# Patient Record
Sex: FEMALE | Race: WHITE
[De-identification: names, ages, dates, MRNs, and addresses within clinical notes are randomized per-mention and may not be internally consistent; named-entity substitution may affect disease eponyms.]

---

## 2017-06-08 ENCOUNTER — RX ONLY (OUTPATIENT)
Age: 67
Setting detail: RX ONLY
End: 2017-06-08

## 2017-06-08 RX ORDER — BETAMETHASONE DIPROPIONATE 0.5 MG/G
CREAM TOPICAL
Qty: 1 | Refills: 2 | Status: ERX | COMMUNITY
Start: 2017-06-08

## 2017-10-12 ENCOUNTER — APPOINTMENT (RX ONLY)
Dept: URBAN - METROPOLITAN AREA CLINIC 116 | Facility: CLINIC | Age: 67
Setting detail: DERMATOLOGY
End: 2017-10-12

## 2017-10-12 DIAGNOSIS — L20.89 OTHER ATOPIC DERMATITIS: ICD-10-CM

## 2017-10-12 DIAGNOSIS — L29.89 OTHER PRURITUS: ICD-10-CM

## 2017-10-12 DIAGNOSIS — L81.4 OTHER MELANIN HYPERPIGMENTATION: ICD-10-CM

## 2017-10-12 PROBLEM — L29.8 OTHER PRURITUS: Status: ACTIVE | Noted: 2017-10-12

## 2017-10-12 PROCEDURE — ? COUNSELING

## 2017-10-12 PROCEDURE — 96372 THER/PROPH/DIAG INJ SC/IM: CPT

## 2017-10-12 PROCEDURE — ? TREATMENT REGIMEN

## 2017-10-12 PROCEDURE — ? PRESCRIPTION

## 2017-10-12 PROCEDURE — 99214 OFFICE O/P EST MOD 30 MIN: CPT | Mod: 25

## 2017-10-12 PROCEDURE — ? INTRAMUSCULAR KENALOG

## 2017-10-12 RX ORDER — TRIAMCINOLONE ACETONIDE 1 MG/G
CREAM TOPICAL
Qty: 1 | Refills: 3 | Status: ERX | COMMUNITY
Start: 2017-10-12

## 2017-10-12 RX ADMIN — TRIAMCINOLONE ACETONIDE 1: 1 CREAM TOPICAL at 00:00

## 2017-10-12 ASSESSMENT — LOCATION ZONE DERM
LOCATION ZONE: LEG
LOCATION ZONE: TRUNK

## 2017-10-12 ASSESSMENT — LOCATION DETAILED DESCRIPTION DERM
LOCATION DETAILED: PERIUMBILICAL SKIN
LOCATION DETAILED: RIGHT INFERIOR UPPER BACK
LOCATION DETAILED: RIGHT BUTTOCK
LOCATION DETAILED: RIGHT KNEE

## 2017-10-12 ASSESSMENT — LOCATION SIMPLE DESCRIPTION DERM
LOCATION SIMPLE: ABDOMEN
LOCATION SIMPLE: RIGHT KNEE
LOCATION SIMPLE: RIGHT UPPER BACK
LOCATION SIMPLE: RIGHT BUTTOCK

## 2017-10-12 NOTE — PROCEDURE: INTRAMUSCULAR KENALOG
Kenalog Preparation: kenalog
Detail Level: None
Lot # (Optional): [id number]
Consent: The risks of atrophy were reviewed with the patient. Risks, benefits, side effects discussed including purpura, increased blood pressure, increased blood sugar, glaucoma, adrenal gland insufficiency, thinning of bones.
Concentration (Mg/Ml): 40.0
Administered By (Optional): Manny AMOR
Total Volume (Ccs): 1.5
Expiration Date (Optional): 10/2018

## 2018-09-25 ENCOUNTER — APPOINTMENT (RX ONLY)
Dept: URBAN - METROPOLITAN AREA CLINIC 116 | Facility: CLINIC | Age: 68
Setting detail: DERMATOLOGY
End: 2018-09-25

## 2018-09-25 DIAGNOSIS — L20.89 OTHER ATOPIC DERMATITIS: ICD-10-CM

## 2018-09-25 DIAGNOSIS — L29.89 OTHER PRURITUS: ICD-10-CM

## 2018-09-25 PROBLEM — L29.8 OTHER PRURITUS: Status: ACTIVE | Noted: 2018-09-25

## 2018-09-25 PROCEDURE — ? INTRAMUSCULAR KENALOG

## 2018-09-25 PROCEDURE — 96372 THER/PROPH/DIAG INJ SC/IM: CPT

## 2018-09-25 PROCEDURE — 99214 OFFICE O/P EST MOD 30 MIN: CPT | Mod: 25

## 2018-09-25 PROCEDURE — ? TREATMENT REGIMEN

## 2018-09-25 PROCEDURE — ? COUNSELING

## 2018-09-25 ASSESSMENT — LOCATION ZONE DERM
LOCATION ZONE: TRUNK
LOCATION ZONE: LEG

## 2018-09-25 ASSESSMENT — LOCATION DETAILED DESCRIPTION DERM
LOCATION DETAILED: RIGHT INFERIOR UPPER BACK
LOCATION DETAILED: PERIUMBILICAL SKIN
LOCATION DETAILED: RIGHT KNEE
LOCATION DETAILED: LEFT BUTTOCK

## 2018-09-25 ASSESSMENT — LOCATION SIMPLE DESCRIPTION DERM
LOCATION SIMPLE: LEFT BUTTOCK
LOCATION SIMPLE: RIGHT UPPER BACK
LOCATION SIMPLE: ABDOMEN
LOCATION SIMPLE: RIGHT KNEE

## 2018-09-25 NOTE — PROCEDURE: TREATMENT REGIMEN
Detail Level: Zone
Otc Regimen: Free and clear products
Continue Regimen: Cuba Chin
Continue Regimen: Triamcinolone cream as needed

## 2018-09-25 NOTE — PROCEDURE: INTRAMUSCULAR KENALOG
Ndc# (Optional): 9233666491
Concentration (Mg/Ml): 40.0
Consent: The risks of atrophy were reviewed with the patient. Risks, benefits, side effects discussed including purpura, increased blood pressure, increased blood sugar, glaucoma, adrenal gland insufficiency, thinning of bones.
Administered By (Optional): Yamila Fuentes
Detail Level: None
Concentration (Mg/Ml) Of Additional Medication: 2.5
Total Volume (Ccs): 1.5
Lot # (Optional): [id number]
Kenalog Preparation: kenalog
Expiration Date (Optional): 2/20
Add Option For Additional Mediation: No

## 2019-06-07 ENCOUNTER — APPOINTMENT (RX ONLY)
Dept: URBAN - METROPOLITAN AREA CLINIC 128 | Facility: CLINIC | Age: 69
Setting detail: DERMATOLOGY
End: 2019-06-07

## 2019-06-07 DIAGNOSIS — L29.89 OTHER PRURITUS: ICD-10-CM

## 2019-06-07 DIAGNOSIS — L82.0 INFLAMED SEBORRHEIC KERATOSIS: ICD-10-CM

## 2019-06-07 DIAGNOSIS — L28.0 LICHEN SIMPLEX CHRONICUS: ICD-10-CM

## 2019-06-07 DIAGNOSIS — L20.89 OTHER ATOPIC DERMATITIS: ICD-10-CM

## 2019-06-07 PROBLEM — L29.8 OTHER PRURITUS: Status: ACTIVE | Noted: 2019-06-07

## 2019-06-07 PROCEDURE — ? PRESCRIPTION

## 2019-06-07 PROCEDURE — ? COUNSELING

## 2019-06-07 PROCEDURE — 11900 INJECT SKIN LESIONS </W 7: CPT

## 2019-06-07 PROCEDURE — ? TREATMENT REGIMEN

## 2019-06-07 PROCEDURE — ? INTRALESIONAL KENALOG

## 2019-06-07 PROCEDURE — 99214 OFFICE O/P EST MOD 30 MIN: CPT | Mod: 25

## 2019-06-07 PROCEDURE — ? OTHER

## 2019-06-07 RX ORDER — CLOBETASOL PROPIONATE 0.5 MG/G
OINTMENT TOPICAL
Qty: 1 | Refills: 2 | Status: ERX | COMMUNITY
Start: 2019-06-07

## 2019-06-07 RX ADMIN — CLOBETASOL PROPIONATE 1: 0.5 OINTMENT TOPICAL at 00:00

## 2019-06-07 ASSESSMENT — LOCATION DETAILED DESCRIPTION DERM
LOCATION DETAILED: RIGHT INFERIOR UPPER BACK
LOCATION DETAILED: LEFT ANTERIOR DISTAL UPPER ARM
LOCATION DETAILED: RIGHT KNEE
LOCATION DETAILED: RIGHT MEDIAL KNEE
LOCATION DETAILED: PERIUMBILICAL SKIN

## 2019-06-07 ASSESSMENT — LOCATION ZONE DERM
LOCATION ZONE: LEG
LOCATION ZONE: TRUNK
LOCATION ZONE: ARM

## 2019-06-07 ASSESSMENT — LOCATION SIMPLE DESCRIPTION DERM
LOCATION SIMPLE: RIGHT KNEE
LOCATION SIMPLE: LEFT UPPER ARM
LOCATION SIMPLE: RIGHT UPPER BACK
LOCATION SIMPLE: ABDOMEN

## 2019-06-07 NOTE — PROCEDURE: TREATMENT REGIMEN
Detail Level: Zone
Continue Regimen: Triamcinolone cream as needed
Continue Regimen: Luise Cowden
Otc Regimen: Free and clear products

## 2019-06-07 NOTE — PROCEDURE: MIPS QUALITY
Detail Level: Detailed
Quality 131: Pain Assessment And Follow-Up: Pain assessment using a standardized tool is documented as negative, no follow-up plan required
Quality 130: Documentation Of Current Medications In The Medical Record: Current Medications Documented
Additional Notes: Pain 0/10\\nPatient does not think she had it
Quality 474: Zoster Vaccination Status: Shingrix vaccine was not administered for reasons documented by clinician (e.g. patient administered vaccine other than Shingrix, patient allergy or other medical reasons, patient declined or other patient reasons, vaccine not available or other system reasons)

## 2019-06-07 NOTE — PROCEDURE: INTRALESIONAL KENALOG
Lot # (Optional): [id number]
X Size Of Lesion In Cm (Optional): 0
Administered By (Optional): Leah Cantrell
Consent: The risks of atrophy were reviewed with the patient.
Total Volume Injected (Ccs- Only Use Numbers And Decimals): 1
Include Z78.9 (Other Specified Conditions Influencing Health Status) As An Associated Diagnosis?: No
Ndc# For Kenalog Only: 6644-6803-94
Detail Level: Detailed
Medical Necessity Clause: This procedure was medically necessary because the lesions that were treated were:
Expiration Date (Optional): 09/2020
Kenalog Preparation: Kenalog
Concentration Of Solution Injected (Mg/Ml): 10.0

## 2020-02-19 ENCOUNTER — APPOINTMENT (RX ONLY)
Dept: URBAN - METROPOLITAN AREA CLINIC 128 | Facility: CLINIC | Age: 70
Setting detail: DERMATOLOGY
End: 2020-02-19

## 2020-02-19 DIAGNOSIS — L29.89 OTHER PRURITUS: ICD-10-CM

## 2020-02-19 DIAGNOSIS — L28.0 LICHEN SIMPLEX CHRONICUS: ICD-10-CM

## 2020-02-19 DIAGNOSIS — L20.89 OTHER ATOPIC DERMATITIS: ICD-10-CM

## 2020-02-19 PROBLEM — L29.8 OTHER PRURITUS: Status: ACTIVE | Noted: 2020-02-19

## 2020-02-19 PROCEDURE — ? INTRAMUSCULAR KENALOG

## 2020-02-19 PROCEDURE — ? TREATMENT REGIMEN

## 2020-02-19 PROCEDURE — ? PRESCRIPTION

## 2020-02-19 PROCEDURE — 96372 THER/PROPH/DIAG INJ SC/IM: CPT

## 2020-02-19 PROCEDURE — ? COUNSELING

## 2020-02-19 PROCEDURE — 99214 OFFICE O/P EST MOD 30 MIN: CPT | Mod: 25

## 2020-02-19 PROCEDURE — ? INJECTION

## 2020-02-19 RX ORDER — CRISABOROLE 20 MG/G
1 OINTMENT TOPICAL BID
Qty: 1 | Refills: 3 | Status: ERX | COMMUNITY
Start: 2020-02-19

## 2020-02-19 RX ADMIN — CRISABOROLE 1: 20 OINTMENT TOPICAL at 00:00

## 2020-02-19 ASSESSMENT — LOCATION SIMPLE DESCRIPTION DERM
LOCATION SIMPLE: RIGHT BUTTOCK
LOCATION SIMPLE: ABDOMEN
LOCATION SIMPLE: RIGHT UPPER BACK
LOCATION SIMPLE: RIGHT KNEE

## 2020-02-19 ASSESSMENT — LOCATION ZONE DERM
LOCATION ZONE: TRUNK
LOCATION ZONE: LEG

## 2020-02-19 ASSESSMENT — LOCATION DETAILED DESCRIPTION DERM
LOCATION DETAILED: RIGHT KNEE
LOCATION DETAILED: PERIUMBILICAL SKIN
LOCATION DETAILED: RIGHT BUTTOCK
LOCATION DETAILED: RIGHT INFERIOR UPPER BACK

## 2020-02-19 NOTE — PROCEDURE: TREATMENT REGIMEN
Detail Level: Zone
Continue Regimen: Triamcinolone cream as needed\\n\\nClobetasol cream as needed
Otc Regimen: Free and clear products
Continue Regimen: Payal Quiles

## 2020-02-19 NOTE — PROCEDURE: INTRAMUSCULAR KENALOG
Add Option For Additional Mediation: No
Administered By (Optional): ZULEYMA Hernandez
Expiration Date (Optional): 03/2021
Concentration (Mg/Ml) Of Additional Medication: 2.5
Total Volume (Ccs): 1.5
Kenalog Preparation: kenalog
Concentration (Mg/Ml): 40.0
Ndc# Optional): 4842-9773-77
Detail Level: Simple
Consent: The risks of atrophy were reviewed with the patient. Risks, benefits, side effects discussed including purpura, increased blood pressure, increased blood sugar, glaucoma, adrenal gland insufficiency, thinning of bones.
Lot # (Optional): [id number]

## 2020-02-19 NOTE — HPI: RASH
What Type Of Note Output Would You Prefer (Optional)?: Standard Output
How Severe Is Your Rash?: moderate
Is This A New Presentation, Or A Follow-Up?: Rash
Additional History: Clobetasol cream and Triamcinolone cream have not helped

## 2020-02-19 NOTE — PROCEDURE: INJECTION
Lot # (Optional): [id number]
Expiration Date (Optional): 3/2021
Ndc # (Optional): 1824-6477-54
Total Volume Injected In Cc (Will Not Affected Billing): 1
Render J-Code Information In Note?: yes
Hide Second Medication?: No
Administered By (Optional): ZULEYMA Hernandez
Dose Administered (Numbers Only): 0
Units: mg
Consent: The risks of the medication was reviewed with the patient. Risks, benefits and side effects discussed including purpura, increased blood pressure, increased blood sugar, glaucoma, adrenal gland insufficiency, thinning of bones.
Post-Care Instructions: I reviewed with the patient in detail post-care instructions. Patient understands to keep the injection sites clean and call the clinic if there is any redness, swelling or pain.
Procedure Information: Please note that the numeric value listed in the Medication (1) and associated J-code units and Medication (2) and associated J-code units variables are j-code amounts and do not represent either the concentration or the total amount of the medications injected. I strongly recommend selecting no to the Render J-code information in note question. This will allow your note to be more clear. If you are billing j-codes with your injection codes you need to document the total amount of the medication injected. This amount should match the j-code units. For example, if you are injecting Triamcinolone 40mg as an intramuscular injection you would select 40 for the dose field and mg for the units. This would allow you to document  with 4 units (40mg = 10mg x 4). The total volume is not used to calculate j-codes only the amount of the medication administered.
Detail Level: Detailed
Medication (1) And Associated J-Code Units: Betamethasone Sodium Phosphate, 3mg
Lot # (Optional): [id number]
Expiration Date (Optional): 8/2020
Route: IM
Ndc # Optional): 7569-0724-16
Dose Administered (Numbers Only): 6

## 2020-04-01 ENCOUNTER — RX ONLY (OUTPATIENT)
Age: 70
Setting detail: RX ONLY
End: 2020-04-01

## 2020-04-01 RX ORDER — TRIAMCINOLONE ACETONIDE 1 MG/G
1 CREAM TOPICAL BID
Qty: 1 | Refills: 2 | Status: ERX

## 2020-04-16 ENCOUNTER — APPOINTMENT (RX ONLY)
Dept: URBAN - METROPOLITAN AREA CLINIC 128 | Facility: CLINIC | Age: 70
Setting detail: DERMATOLOGY
End: 2020-04-16

## 2020-04-16 DIAGNOSIS — L20.89 OTHER ATOPIC DERMATITIS: ICD-10-CM

## 2020-04-16 DIAGNOSIS — L29.89 OTHER PRURITUS: ICD-10-CM

## 2020-04-16 DIAGNOSIS — L28.0 LICHEN SIMPLEX CHRONICUS: ICD-10-CM

## 2020-04-16 PROBLEM — L29.8 OTHER PRURITUS: Status: ACTIVE | Noted: 2020-04-16

## 2020-04-16 PROCEDURE — 99214 OFFICE O/P EST MOD 30 MIN: CPT | Mod: 25

## 2020-04-16 PROCEDURE — ? TREATMENT REGIMEN

## 2020-04-16 PROCEDURE — ? COUNSELING

## 2020-04-16 PROCEDURE — ? INTRAMUSCULAR KENALOG

## 2020-04-16 PROCEDURE — ? INJECTION

## 2020-04-16 PROCEDURE — 96372 THER/PROPH/DIAG INJ SC/IM: CPT

## 2020-04-16 ASSESSMENT — LOCATION ZONE DERM
LOCATION ZONE: LEG
LOCATION ZONE: TRUNK

## 2020-04-16 ASSESSMENT — LOCATION DETAILED DESCRIPTION DERM
LOCATION DETAILED: LEFT BUTTOCK
LOCATION DETAILED: RIGHT BUTTOCK
LOCATION DETAILED: RIGHT INFERIOR UPPER BACK
LOCATION DETAILED: RIGHT KNEE
LOCATION DETAILED: PERIUMBILICAL SKIN

## 2020-04-16 ASSESSMENT — LOCATION SIMPLE DESCRIPTION DERM
LOCATION SIMPLE: RIGHT BUTTOCK
LOCATION SIMPLE: LEFT BUTTOCK
LOCATION SIMPLE: ABDOMEN
LOCATION SIMPLE: RIGHT KNEE
LOCATION SIMPLE: RIGHT UPPER BACK

## 2020-04-16 NOTE — PROCEDURE: INTRAMUSCULAR KENALOG
Ndc# (Optional): 8245-0346-24
Concentration (Mg/Ml): 40.0
Consent: The risks of atrophy were reviewed with the patient. Risks, benefits, side effects discussed including purpura, increased blood pressure, increased blood sugar, glaucoma, adrenal gland insufficiency, thinning of bones.
Add Option For Additional Mediation: No
Detail Level: Simple
Administered By (Optional): ZULEYMA Hernandez
Expiration Date (Optional): 04/2021
Lot # (Optional): [id number]
Kenalog Preparation: kenalog
Concentration (Mg/Ml) Of Additional Medication: 2.5
Total Volume (Ccs): 1.5

## 2020-04-16 NOTE — PROCEDURE: INJECTION
Units: mg
Medication (1) And Associated J-Code Units: Betamethasone Sodium Phosphate, 3mg
Treatment Number: 0
Ndc # Optional): 9552-7787-24
Post-Care Instructions: I reviewed with the patient in detail post-care instructions. Patient understands to keep the injection sites clean and call the clinic if there is any redness, swelling or pain.
Route: IM
Bill J-Code: no
Render J-Code Information In Note?: yes
Lot # (Optional): [id number]
Detail Level: Detailed
Administered By (Optional): ZULEYMA Hernandez
Expiration Date (Optional): 8/2020
Ndc # Optional): 2223-5177-88
Total Volume Injected In Cc (Will Not Affected Billing): 1
Procedure Information: Please note that the numeric value listed in the Medication (1) and associated J-code units and Medication (2) and associated J-code units variables are j-code amounts and do not represent either the concentration or the total amount of the medications injected. I strongly recommend selecting no to the Render J-code information in note question. This will allow your note to be more clear. If you are billing j-codes with your injection codes you need to document the total amount of the medication injected. This amount should match the j-code units. For example, if you are injecting Triamcinolone 40mg as an intramuscular injection you would select 40 for the dose field and mg for the units. This would allow you to document  with 4 units (40mg = 10mg x 4). The total volume is not used to calculate j-codes only the amount of the medication administered.
Consent: The risks of the medication was reviewed with the patient. Risks, benefits and side effects discussed including purpura, increased blood pressure, increased blood sugar, glaucoma, adrenal gland insufficiency, thinning of bones.
Dose Administered (Numbers Only): 6
Lot # (Optional): [id number]
Expiration Date (Optional): 3/2021

## 2020-04-16 NOTE — PROCEDURE: TREATMENT REGIMEN
Initiate Treatment: Allegra - take 2 tabs in the morning
Continue Regimen: Triamcinolone cream as needed\\n\\nClobetasol cream as needed\\n\\nCetaphil cream - apply throughout the day \\n\\nHydroxyzine- take at night
Detail Level: Zone
Otc Regimen: Free and clear products
Continue Regimen: Sobeida Puri

## 2020-06-03 ENCOUNTER — APPOINTMENT (RX ONLY)
Dept: URBAN - METROPOLITAN AREA CLINIC 128 | Facility: CLINIC | Age: 70
Setting detail: DERMATOLOGY
End: 2020-06-03

## 2020-06-03 DIAGNOSIS — D69.2 OTHER NONTHROMBOCYTOPENIC PURPURA: ICD-10-CM

## 2020-06-03 DIAGNOSIS — L28.0 LICHEN SIMPLEX CHRONICUS: ICD-10-CM

## 2020-06-03 DIAGNOSIS — L20.89 OTHER ATOPIC DERMATITIS: ICD-10-CM

## 2020-06-03 DIAGNOSIS — L29.89 OTHER PRURITUS: ICD-10-CM

## 2020-06-03 PROBLEM — L29.8 OTHER PRURITUS: Status: ACTIVE | Noted: 2020-06-03

## 2020-06-03 PROCEDURE — ? INTRAMUSCULAR KENALOG

## 2020-06-03 PROCEDURE — ? TREATMENT REGIMEN

## 2020-06-03 PROCEDURE — ? COUNSELING

## 2020-06-03 PROCEDURE — 96372 THER/PROPH/DIAG INJ SC/IM: CPT

## 2020-06-03 PROCEDURE — 99214 OFFICE O/P EST MOD 30 MIN: CPT | Mod: 25

## 2020-06-03 ASSESSMENT — LOCATION DETAILED DESCRIPTION DERM
LOCATION DETAILED: LEFT PROXIMAL PRETIBIAL REGION
LOCATION DETAILED: RIGHT PROXIMAL DORSAL FOREARM
LOCATION DETAILED: RIGHT KNEE
LOCATION DETAILED: PERIUMBILICAL SKIN
LOCATION DETAILED: LEFT DISTAL DORSAL FOREARM
LOCATION DETAILED: RIGHT INFERIOR UPPER BACK
LOCATION DETAILED: RIGHT PROXIMAL PRETIBIAL REGION
LOCATION DETAILED: RIGHT BUTTOCK

## 2020-06-03 ASSESSMENT — LOCATION ZONE DERM
LOCATION ZONE: LEG
LOCATION ZONE: TRUNK
LOCATION ZONE: ARM

## 2020-06-03 ASSESSMENT — LOCATION SIMPLE DESCRIPTION DERM
LOCATION SIMPLE: LEFT PRETIBIAL REGION
LOCATION SIMPLE: ABDOMEN
LOCATION SIMPLE: RIGHT FOREARM
LOCATION SIMPLE: RIGHT KNEE
LOCATION SIMPLE: LEFT FOREARM
LOCATION SIMPLE: RIGHT UPPER BACK
LOCATION SIMPLE: RIGHT BUTTOCK
LOCATION SIMPLE: RIGHT PRETIBIAL REGION

## 2020-06-03 NOTE — PROCEDURE: INTRAMUSCULAR KENALOG
Kenalog Preparation: kenalog
Consent: The risks of atrophy were reviewed with the patient. Risks, benefits, side effects discussed including purpura, increased blood pressure, increased blood sugar, glaucoma, adrenal gland insufficiency, thinning of bones.
Administered By (Optional): ZULEYMA Hernandez
Concentration (Mg/Ml): 40.0
Detail Level: Simple
Add Option For Additional Mediation: No
Expiration Date (Optional): 06/2021
Total Volume (Ccs): 1.5
Lot # (Optional): [id number]
Ndc# (Optional): 3515-5572-40
Concentration (Mg/Ml) Of Additional Medication: 2.5

## 2020-06-03 NOTE — PROCEDURE: TREATMENT REGIMEN
Continue Regimen: Triamcinolone cream as needed\\n\\nClobetasol cream as needed\\n\\nCetaphil cream - apply throughout the day \\n\\nHydroxyzine- take at night\\n\\nAllegra - take 2 tabs in the morning
Detail Level: Zone
Continue Regimen: Jn Snide
Otc Regimen: Free and clear products
Detail Level: Simple
Otc Regimen: Recommended Dermend lotion

## 2020-12-08 ENCOUNTER — APPOINTMENT (RX ONLY)
Dept: URBAN - METROPOLITAN AREA CLINIC 116 | Facility: CLINIC | Age: 70
Setting detail: DERMATOLOGY
End: 2020-12-08

## 2020-12-08 ENCOUNTER — RX ONLY (OUTPATIENT)
Age: 70
Setting detail: RX ONLY
End: 2020-12-08

## 2020-12-08 DIAGNOSIS — L29.89 OTHER PRURITUS: ICD-10-CM

## 2020-12-08 DIAGNOSIS — L20.89 OTHER ATOPIC DERMATITIS: ICD-10-CM

## 2020-12-08 PROBLEM — L29.8 OTHER PRURITUS: Status: ACTIVE | Noted: 2020-12-08

## 2020-12-08 PROCEDURE — ? TREATMENT REGIMEN

## 2020-12-08 PROCEDURE — 99214 OFFICE O/P EST MOD 30 MIN: CPT | Mod: 25

## 2020-12-08 PROCEDURE — ? COUNSELING

## 2020-12-08 PROCEDURE — ? INTRAMUSCULAR KENALOG

## 2020-12-08 PROCEDURE — 96372 THER/PROPH/DIAG INJ SC/IM: CPT

## 2020-12-08 RX ORDER — CLOBETASOL PROPIONATE 0.5 MG/G
1 OINTMENT TOPICAL BID
Qty: 1 | Refills: 2 | Status: ERX

## 2020-12-08 ASSESSMENT — LOCATION DETAILED DESCRIPTION DERM
LOCATION DETAILED: RIGHT BUTTOCK
LOCATION DETAILED: PERIUMBILICAL SKIN
LOCATION DETAILED: RIGHT KNEE
LOCATION DETAILED: RIGHT INFERIOR UPPER BACK

## 2020-12-08 ASSESSMENT — LOCATION SIMPLE DESCRIPTION DERM
LOCATION SIMPLE: ABDOMEN
LOCATION SIMPLE: RIGHT UPPER BACK
LOCATION SIMPLE: RIGHT BUTTOCK
LOCATION SIMPLE: RIGHT KNEE

## 2020-12-08 ASSESSMENT — LOCATION ZONE DERM
LOCATION ZONE: LEG
LOCATION ZONE: TRUNK

## 2020-12-08 NOTE — PROCEDURE: TREATMENT REGIMEN
Continue Regimen: Triamcinolone cream - apply twice daily for 2 weeks, 1 week off, then as needed. \\n\\nClobetasol ointment - apply twice daily for 2 weeks, 1 week off, then as needed. \\n\\nCetaphil cream - apply throughout the day \\n\\nHydroxyzine- take at night\\n\\nAllegra - take 1-2 tabs in the morning
Plan: Discussed phototherapy and Dupixent
Detail Level: Zone
Continue Regimen: Vic Juarez
Otc Regimen: Free and clear products

## 2020-12-08 NOTE — PROCEDURE: INTRAMUSCULAR KENALOG
Expiration Date (Optional): 06/2022
Administered By (Optional): ZULEYMA Hernandez
Ndc# (Optional): [address]-6
Consent: The risks of atrophy were reviewed with the patient. Risks, benefits, side effects discussed including purpura, increased blood pressure, increased blood sugar, glaucoma, adrenal gland insufficiency, thinning of bones.
Total Volume (Ccs): 1.5
Concentration (Mg/Ml): 40.0
Lot # (Optional): [id number]
Concentration (Mg/Ml) Of Additional Medication: 2.5
Add Option For Additional Mediation: No
Kenalog Preparation: kenalog
Detail Level: Simple

## 2021-05-11 ENCOUNTER — RX ONLY (OUTPATIENT)
Age: 71
Setting detail: RX ONLY
End: 2021-05-11

## 2021-05-11 ENCOUNTER — APPOINTMENT (RX ONLY)
Dept: URBAN - METROPOLITAN AREA CLINIC 116 | Facility: CLINIC | Age: 71
Setting detail: DERMATOLOGY
End: 2021-05-11

## 2021-05-11 DIAGNOSIS — L28.0 LICHEN SIMPLEX CHRONICUS: ICD-10-CM

## 2021-05-11 DIAGNOSIS — L29.89 OTHER PRURITUS: ICD-10-CM

## 2021-05-11 DIAGNOSIS — L20.89 OTHER ATOPIC DERMATITIS: ICD-10-CM

## 2021-05-11 PROBLEM — L29.8 OTHER PRURITUS: Status: ACTIVE | Noted: 2021-05-11

## 2021-05-11 PROCEDURE — ? COUNSELING

## 2021-05-11 PROCEDURE — 96372 THER/PROPH/DIAG INJ SC/IM: CPT

## 2021-05-11 PROCEDURE — ? TREATMENT REGIMEN

## 2021-05-11 PROCEDURE — ? PRESCRIPTION MEDICATION MANAGEMENT

## 2021-05-11 PROCEDURE — 99214 OFFICE O/P EST MOD 30 MIN: CPT | Mod: 25

## 2021-05-11 PROCEDURE — ? INTRAMUSCULAR KENALOG

## 2021-05-11 RX ORDER — CLOBETASOL PROPIONATE 0.5 MG/G
1 OINTMENT TOPICAL BID
Qty: 1 | Refills: 3 | Status: ERX

## 2021-05-11 ASSESSMENT — LOCATION DETAILED DESCRIPTION DERM
LOCATION DETAILED: LEFT ANTERIOR PROXIMAL THIGH
LOCATION DETAILED: LEFT BUTTOCK
LOCATION DETAILED: PERIUMBILICAL SKIN
LOCATION DETAILED: RIGHT INFERIOR UPPER BACK
LOCATION DETAILED: RIGHT MEDIAL KNEE
LOCATION DETAILED: RIGHT KNEE

## 2021-05-11 ASSESSMENT — LOCATION ZONE DERM
LOCATION ZONE: LEG
LOCATION ZONE: TRUNK

## 2021-05-11 ASSESSMENT — SEVERITY ASSESSMENT 2020: SEVERITY 2020: MODERATE

## 2021-05-11 ASSESSMENT — LOCATION SIMPLE DESCRIPTION DERM
LOCATION SIMPLE: ABDOMEN
LOCATION SIMPLE: LEFT THIGH
LOCATION SIMPLE: RIGHT UPPER BACK
LOCATION SIMPLE: LEFT BUTTOCK
LOCATION SIMPLE: RIGHT KNEE

## 2021-05-11 NOTE — PROCEDURE: INTRAMUSCULAR KENALOG
Concentration (Mg/Ml) Of Additional Medication: 6.0
Detail Level: Simple
Expiration Date (Optional): 5/2022
Ndc# (Optional): 7933-6392-01
Administered By (Optional): ZULEYMA Dhillon
Consent: The risks of atrophy were reviewed with the patient. Risks, benefits, side effects discussed including purpura, increased blood pressure, increased blood sugar, glaucoma, adrenal gland insufficiency, thinning of bones.
Total Volume (Ccs): 1.5
Lot # (Optional): [id number]
Add Option For Additional Mediation: Yes
Kenalog Preparation: kenalog
Concentration (Mg/Ml): 40.0

## 2021-05-11 NOTE — PROCEDURE: PRESCRIPTION MEDICATION MANAGEMENT
Render In Strict Bullet Format?: No
Detail Level: Simple
Continue Regimen: Triamcinolone cream - apply twice daily for 2 weeks, 1 week off, then as needed. \\n\\nClobetasol ointment - apply twice daily for 2 weeks, 1 week off, then as needed. \\n\\nCetaphil cream - apply throughout the day \\n\\nHydroxyzine- take at night\\n\\nAllegra - take 1-2 tabs in the morning. \\n\\nPlan: Discussed phototherapy and Dupixent.

## 2021-08-19 ENCOUNTER — APPOINTMENT (RX ONLY)
Dept: URBAN - METROPOLITAN AREA CLINIC 116 | Facility: CLINIC | Age: 71
Setting detail: DERMATOLOGY
End: 2021-08-19

## 2021-08-19 DIAGNOSIS — L28.0 LICHEN SIMPLEX CHRONICUS: ICD-10-CM

## 2021-08-19 DIAGNOSIS — L29.89 OTHER PRURITUS: ICD-10-CM

## 2021-08-19 DIAGNOSIS — L20.89 OTHER ATOPIC DERMATITIS: ICD-10-CM

## 2021-08-19 PROBLEM — L29.8 OTHER PRURITUS: Status: ACTIVE | Noted: 2021-08-19

## 2021-08-19 PROCEDURE — ? INTRAMUSCULAR KENALOG

## 2021-08-19 PROCEDURE — ? TREATMENT REGIMEN

## 2021-08-19 PROCEDURE — ? COUNSELING

## 2021-08-19 PROCEDURE — 96372 THER/PROPH/DIAG INJ SC/IM: CPT

## 2021-08-19 PROCEDURE — 99214 OFFICE O/P EST MOD 30 MIN: CPT | Mod: 25

## 2021-08-19 PROCEDURE — ? PRESCRIPTION MEDICATION MANAGEMENT

## 2021-08-19 ASSESSMENT — LOCATION ZONE DERM
LOCATION ZONE: TRUNK
LOCATION ZONE: LEG

## 2021-08-19 ASSESSMENT — LOCATION SIMPLE DESCRIPTION DERM
LOCATION SIMPLE: ABDOMEN
LOCATION SIMPLE: RIGHT BUTTOCK
LOCATION SIMPLE: RIGHT KNEE
LOCATION SIMPLE: LEFT THIGH
LOCATION SIMPLE: RIGHT UPPER BACK

## 2021-08-19 ASSESSMENT — SEVERITY ASSESSMENT 2020: SEVERITY 2020: MODERATE

## 2021-08-19 ASSESSMENT — LOCATION DETAILED DESCRIPTION DERM
LOCATION DETAILED: RIGHT INFERIOR UPPER BACK
LOCATION DETAILED: RIGHT KNEE
LOCATION DETAILED: RIGHT BUTTOCK
LOCATION DETAILED: PERIUMBILICAL SKIN
LOCATION DETAILED: LEFT ANTERIOR PROXIMAL THIGH
LOCATION DETAILED: RIGHT MEDIAL KNEE

## 2021-08-19 NOTE — PROCEDURE: PRESCRIPTION MEDICATION MANAGEMENT
Plan: Discussed phototherapy and Dupixent.
Render In Strict Bullet Format?: No
Detail Level: Simple
Continue Regimen: Triamcinolone cream - apply twice daily for 2 weeks, 1 week off, then as needed. \\n\\nClobetasol ointment - apply twice daily for 2 weeks, 1 week off, then as needed. \\n\\nCetaphil cream - apply throughout the day \\n\\nHydroxyzine- take at night\\n\\nAllegra - take 1-2 tabs in the morning.

## 2021-08-19 NOTE — PROCEDURE: INTRAMUSCULAR KENALOG
Concentration (Mg/Ml) Of Additional Medication: 6.0
Detail Level: Simple
Expiration Date (Optional): 5/2022
Ndc# (Optional): 9472-7996-25
Administered By (Optional): ZULEYMA Dhillon
Consent: The risks of atrophy were reviewed with the patient. Risks, benefits, side effects discussed including purpura, increased blood pressure, increased blood sugar, glaucoma, adrenal gland insufficiency, thinning of bones.
Total Volume (Ccs): 1.5
Lot # (Optional): [id number]
Add Option For Additional Mediation: Yes
Kenalog Preparation: kenalog
Concentration (Mg/Ml): 40.0

## 2021-11-30 ENCOUNTER — APPOINTMENT (RX ONLY)
Dept: URBAN - METROPOLITAN AREA CLINIC 116 | Facility: CLINIC | Age: 71
Setting detail: DERMATOLOGY
End: 2021-11-30

## 2021-11-30 DIAGNOSIS — L29.89 OTHER PRURITUS: ICD-10-CM

## 2021-11-30 DIAGNOSIS — L20.89 OTHER ATOPIC DERMATITIS: ICD-10-CM | Status: INADEQUATELY CONTROLLED

## 2021-11-30 PROBLEM — L29.8 OTHER PRURITUS: Status: ACTIVE | Noted: 2021-11-30

## 2021-11-30 PROCEDURE — ? COUNSELING

## 2021-11-30 PROCEDURE — ? TREATMENT REGIMEN

## 2021-11-30 PROCEDURE — 99213 OFFICE O/P EST LOW 20 MIN: CPT | Mod: 25

## 2021-11-30 PROCEDURE — ? INTRAMUSCULAR KENALOG

## 2021-11-30 PROCEDURE — 96372 THER/PROPH/DIAG INJ SC/IM: CPT

## 2021-11-30 ASSESSMENT — LOCATION SIMPLE DESCRIPTION DERM
LOCATION SIMPLE: RIGHT BUTTOCK
LOCATION SIMPLE: RIGHT UPPER BACK
LOCATION SIMPLE: RIGHT KNEE
LOCATION SIMPLE: ABDOMEN

## 2021-11-30 ASSESSMENT — LOCATION ZONE DERM
LOCATION ZONE: TRUNK
LOCATION ZONE: LEG

## 2021-11-30 ASSESSMENT — LOCATION DETAILED DESCRIPTION DERM
LOCATION DETAILED: RIGHT INFERIOR UPPER BACK
LOCATION DETAILED: PERIUMBILICAL SKIN
LOCATION DETAILED: RIGHT KNEE
LOCATION DETAILED: RIGHT BUTTOCK

## 2021-11-30 NOTE — PROCEDURE: INTRAMUSCULAR KENALOG
Concentration (Mg/Ml) Of Additional Medication: 6.0
Detail Level: Simple
Expiration Date (Optional): 6/2022
Ndc# (Optional): 4376-3954-42
Administered By (Optional): Merline Rondo
Consent: The risks of atrophy were reviewed with the patient. Risks, benefits, side effects discussed including purpura, increased blood pressure, increased blood sugar, glaucoma, adrenal gland insufficiency, thinning of bones.
Total Volume (Ccs): 1.5
Lot # (Optional): [id number]
Add Option For Additional Mediation: Yes
Kenalog Preparation: kenalog
Concentration (Mg/Ml): 40.0

## 2022-02-22 ENCOUNTER — APPOINTMENT (RX ONLY)
Dept: URBAN - METROPOLITAN AREA CLINIC 116 | Facility: CLINIC | Age: 72
Setting detail: DERMATOLOGY
End: 2022-02-22

## 2022-02-22 DIAGNOSIS — L29.89 OTHER PRURITUS: ICD-10-CM

## 2022-02-22 DIAGNOSIS — L20.89 OTHER ATOPIC DERMATITIS: ICD-10-CM

## 2022-02-22 DIAGNOSIS — L28.0 LICHEN SIMPLEX CHRONICUS: ICD-10-CM

## 2022-02-22 PROBLEM — L29.8 OTHER PRURITUS: Status: ACTIVE | Noted: 2022-02-22

## 2022-02-22 PROCEDURE — ? INTRALESIONAL KENALOG

## 2022-02-22 PROCEDURE — 11900 INJECT SKIN LESIONS </W 7: CPT

## 2022-02-22 PROCEDURE — ? TREATMENT REGIMEN

## 2022-02-22 PROCEDURE — ? SEPARATE AND IDENTIFIABLE DOCUMENTATION

## 2022-02-22 PROCEDURE — ? COUNSELING

## 2022-02-22 PROCEDURE — ? INTRAMUSCULAR KENALOG

## 2022-02-22 PROCEDURE — 96372 THER/PROPH/DIAG INJ SC/IM: CPT | Mod: 59

## 2022-02-22 PROCEDURE — 99213 OFFICE O/P EST LOW 20 MIN: CPT | Mod: 25

## 2022-02-22 ASSESSMENT — LOCATION ZONE DERM
LOCATION ZONE: LEG
LOCATION ZONE: TRUNK

## 2022-02-22 ASSESSMENT — LOCATION SIMPLE DESCRIPTION DERM
LOCATION SIMPLE: RIGHT KNEE
LOCATION SIMPLE: LEFT BUTTOCK
LOCATION SIMPLE: ABDOMEN
LOCATION SIMPLE: RIGHT UPPER BACK

## 2022-02-22 ASSESSMENT — LOCATION DETAILED DESCRIPTION DERM
LOCATION DETAILED: RIGHT INFERIOR UPPER BACK
LOCATION DETAILED: LEFT BUTTOCK
LOCATION DETAILED: RIGHT KNEE
LOCATION DETAILED: PERIUMBILICAL SKIN

## 2022-02-22 ASSESSMENT — SEVERITY ASSESSMENT 2020: SEVERITY 2020: MODERATE

## 2022-02-22 ASSESSMENT — SEVERITY ASSESSMENT: SEVERITY: MODERATE

## 2022-02-22 NOTE — PROCEDURE: SEPARATE AND IDENTIFIABLE DOCUMENTATION
Have Your Spot(S) Been Treated In The Past?: has not been treated
Hpi Title: Evaluation of Skin Lesions
Detail Level: Detailed

## 2022-02-22 NOTE — PROCEDURE: INTRALESIONAL KENALOG
Medical Necessity Clause: This procedure was medically necessary because the lesions that were treated were:
Lot # (Optional): [id number]
Kenalog Preparation: Kenalog
Total Volume Injected (Ccs- Only Use Numbers And Decimals): .5
Concentration Of Solution Injected (Mg/Ml): 10.0
Detail Level: Simple
Consent: The risks of atrophy were reviewed with the patient.
Ndc# For Kenalog Only: 6335-8280-13
X Size Of Lesion In Cm (Optional): 0
Validate Note Data When Using Inventory: Yes
Administered By (Optional): DEANDRA Michaels
Include Z78.9 (Other Specified Conditions Influencing Health Status) As An Associated Diagnosis?: No
Expiration Date (Optional): 4/2023

## 2022-02-22 NOTE — PROCEDURE: INTRAMUSCULAR KENALOG
Concentration (Mg/Ml) Of Additional Medication: 6.0
Detail Level: Simple
Expiration Date (Optional): 12/2022
Ndc# (Optional): 4041-7196-35
Administered By (Optional): ZULEYMA Dhillon
Consent: The risks of atrophy were reviewed with the patient. Risks, benefits, side effects discussed including purpura, increased blood pressure, increased blood sugar, glaucoma, adrenal gland insufficiency, thinning of bones.
Total Volume (Ccs): 1.5
Lot # (Optional): [id number]
Add Option For Additional Mediation: Yes
Kenalog Preparation: kenalog
Concentration (Mg/Ml): 40.0

## 2022-05-26 ENCOUNTER — APPOINTMENT (RX ONLY)
Dept: URBAN - METROPOLITAN AREA CLINIC 116 | Facility: CLINIC | Age: 72
Setting detail: DERMATOLOGY
End: 2022-05-26

## 2022-05-26 DIAGNOSIS — L20.89 OTHER ATOPIC DERMATITIS: ICD-10-CM | Status: IMPROVED

## 2022-05-26 DIAGNOSIS — L28.0 LICHEN SIMPLEX CHRONICUS: ICD-10-CM

## 2022-05-26 DIAGNOSIS — L29.89 OTHER PRURITUS: ICD-10-CM

## 2022-05-26 PROBLEM — L29.8 OTHER PRURITUS: Status: ACTIVE | Noted: 2022-05-26

## 2022-05-26 PROCEDURE — ? SEPARATE AND IDENTIFIABLE DOCUMENTATION

## 2022-05-26 PROCEDURE — ? COUNSELING

## 2022-05-26 PROCEDURE — 96372 THER/PROPH/DIAG INJ SC/IM: CPT | Mod: 59

## 2022-05-26 PROCEDURE — ? INTRAMUSCULAR KENALOG

## 2022-05-26 PROCEDURE — 99214 OFFICE O/P EST MOD 30 MIN: CPT | Mod: 25

## 2022-05-26 PROCEDURE — ? TREATMENT REGIMEN

## 2022-05-26 PROCEDURE — ? PRESCRIPTION

## 2022-05-26 PROCEDURE — ? INTRALESIONAL KENALOG

## 2022-05-26 PROCEDURE — 11900 INJECT SKIN LESIONS </W 7: CPT

## 2022-05-26 RX ORDER — TRIAMCINOLONE ACETONIDE 1 MG/G
1 CREAM TOPICAL BID
Qty: 454 | Refills: 2 | Status: ERX | COMMUNITY
Start: 2022-05-26

## 2022-05-26 RX ADMIN — TRIAMCINOLONE ACETONIDE 1: 1 CREAM TOPICAL at 00:00

## 2022-05-26 ASSESSMENT — LOCATION ZONE DERM
LOCATION ZONE: TRUNK
LOCATION ZONE: LEG

## 2022-05-26 ASSESSMENT — LOCATION DETAILED DESCRIPTION DERM
LOCATION DETAILED: RIGHT KNEE
LOCATION DETAILED: PERIUMBILICAL SKIN
LOCATION DETAILED: RIGHT INFERIOR UPPER BACK
LOCATION DETAILED: RIGHT BUTTOCK

## 2022-05-26 ASSESSMENT — SEVERITY ASSESSMENT 2020: SEVERITY 2020: MODERATE

## 2022-05-26 ASSESSMENT — LOCATION SIMPLE DESCRIPTION DERM
LOCATION SIMPLE: RIGHT UPPER BACK
LOCATION SIMPLE: ABDOMEN
LOCATION SIMPLE: RIGHT BUTTOCK
LOCATION SIMPLE: RIGHT KNEE

## 2022-05-26 NOTE — PROCEDURE: INTRALESIONAL KENALOG
Medical Necessity Clause: This procedure was medically necessary because the lesions that were treated were:
Lot # (Optional): [id number]
Kenalog Preparation: Kenalog
Total Volume Injected (Ccs- Only Use Numbers And Decimals): .5
Concentration Of Solution Injected (Mg/Ml): 10.0
Detail Level: Simple
Consent: The risks of atrophy were reviewed with the patient.
Ndc# For Kenalog Only: 7135-4813-84
X Size Of Lesion In Cm (Optional): 0
Validate Note Data When Using Inventory: Yes
Administered By (Optional): DEANDRA Mora
Include Z78.9 (Other Specified Conditions Influencing Health Status) As An Associated Diagnosis?: No
Expiration Date (Optional): 4/2023

## 2022-05-26 NOTE — PROCEDURE: INTRAMUSCULAR KENALOG
Consent: The risks of atrophy were reviewed with the patient. Risks, benefits, side effects discussed including purpura, increased blood pressure, increased blood sugar, glaucoma, adrenal gland insufficiency, thinning of bones.
Add Option For Additional Mediation: Yes
Detail Level: Simple
Total Volume (Ccs): 1.5
Concentration (Mg/Ml): 40.0
Ndc# Optional): 7843-2279-12
Administered By (Optional): ZULEYMA Dhillon
Concentration (Mg/Ml) Of Additional Medication: 6.0
Kenalog Preparation: kenalog
Lot # (Optional): [id number]
Expiration Date (Optional): FEB 2023

## 2022-10-19 ENCOUNTER — APPOINTMENT (RX ONLY)
Dept: URBAN - METROPOLITAN AREA CLINIC 128 | Facility: CLINIC | Age: 72
Setting detail: DERMATOLOGY
End: 2022-10-19

## 2022-10-19 DIAGNOSIS — L85.3 XEROSIS CUTIS: ICD-10-CM

## 2022-10-19 DIAGNOSIS — L20.89 OTHER ATOPIC DERMATITIS: ICD-10-CM | Status: INADEQUATELY CONTROLLED

## 2022-10-19 DIAGNOSIS — L29.89 OTHER PRURITUS: ICD-10-CM

## 2022-10-19 PROBLEM — L29.8 OTHER PRURITUS: Status: ACTIVE | Noted: 2022-10-19

## 2022-10-19 PROCEDURE — ? COUNSELING

## 2022-10-19 PROCEDURE — ? TREATMENT REGIMEN

## 2022-10-19 PROCEDURE — ? SEPARATE AND IDENTIFIABLE DOCUMENTATION

## 2022-10-19 PROCEDURE — 96372 THER/PROPH/DIAG INJ SC/IM: CPT

## 2022-10-19 PROCEDURE — ? PRESCRIPTION

## 2022-10-19 PROCEDURE — 99214 OFFICE O/P EST MOD 30 MIN: CPT | Mod: 25

## 2022-10-19 PROCEDURE — ? INTRAMUSCULAR KENALOG

## 2022-10-19 RX ORDER — CLOBETASOL PROPIONATE 0.5 MG/G
1 CREAM TOPICAL BID
Qty: 60 | Refills: 3 | Status: ERX | COMMUNITY
Start: 2022-10-19

## 2022-10-19 RX ADMIN — CLOBETASOL PROPIONATE 1: 0.5 CREAM TOPICAL at 00:00

## 2022-10-19 ASSESSMENT — LOCATION SIMPLE DESCRIPTION DERM
LOCATION SIMPLE: ABDOMEN
LOCATION SIMPLE: RIGHT UPPER BACK
LOCATION SIMPLE: LEFT UPPER BACK
LOCATION SIMPLE: RIGHT KNEE
LOCATION SIMPLE: RIGHT BUTTOCK

## 2022-10-19 ASSESSMENT — LOCATION DETAILED DESCRIPTION DERM
LOCATION DETAILED: LEFT MEDIAL UPPER BACK
LOCATION DETAILED: RIGHT KNEE
LOCATION DETAILED: PERIUMBILICAL SKIN
LOCATION DETAILED: RIGHT INFERIOR UPPER BACK
LOCATION DETAILED: RIGHT BUTTOCK

## 2022-10-19 ASSESSMENT — LOCATION ZONE DERM
LOCATION ZONE: LEG
LOCATION ZONE: TRUNK

## 2022-10-19 NOTE — PROCEDURE: COUNSELING
Detail Level: Detailed
Detail Level: Zone
Detail Level: Generalized
Moisturizer Recommendations: Recommended daily moisturizer cetaphil, cerave or eucerin

## 2022-10-19 NOTE — PROCEDURE: INTRAMUSCULAR KENALOG
Consent: The risks of atrophy were reviewed with the patient. Risks, benefits, side effects discussed including purpura, increased blood pressure, increased blood sugar, glaucoma, adrenal gland insufficiency, thinning of bones.
Add Option For Additional Mediation: Yes
Detail Level: Simple
Total Volume (Ccs): 1.5
Concentration (Mg/Ml): 40.0
Ndc# Optional): 2942-0626-42
Administered By (Optional): Cuauhtemoc Miramontes MA
Concentration (Mg/Ml) Of Additional Medication: 6.0
Kenalog Preparation: kenalog
Lot # (Optional): [id number]
Expiration Date (Optional): MAR 2023

## 2023-01-25 ENCOUNTER — APPOINTMENT (RX ONLY)
Dept: URBAN - METROPOLITAN AREA CLINIC 128 | Facility: CLINIC | Age: 73
Setting detail: DERMATOLOGY
End: 2023-01-25

## 2023-01-25 DIAGNOSIS — L85.3 XEROSIS CUTIS: ICD-10-CM

## 2023-01-25 DIAGNOSIS — L29.89 OTHER PRURITUS: ICD-10-CM

## 2023-01-25 DIAGNOSIS — L20.89 OTHER ATOPIC DERMATITIS: ICD-10-CM | Status: IMPROVED

## 2023-01-25 DIAGNOSIS — L28.0 LICHEN SIMPLEX CHRONICUS: ICD-10-CM

## 2023-01-25 PROBLEM — L29.8 OTHER PRURITUS: Status: ACTIVE | Noted: 2023-01-25

## 2023-01-25 PROCEDURE — 11900 INJECT SKIN LESIONS </W 7: CPT

## 2023-01-25 PROCEDURE — ? INTRAMUSCULAR KENALOG

## 2023-01-25 PROCEDURE — 99214 OFFICE O/P EST MOD 30 MIN: CPT | Mod: 25

## 2023-01-25 PROCEDURE — ? SEPARATE AND IDENTIFIABLE DOCUMENTATION

## 2023-01-25 PROCEDURE — ? TREATMENT REGIMEN

## 2023-01-25 PROCEDURE — 96372 THER/PROPH/DIAG INJ SC/IM: CPT | Mod: 59

## 2023-01-25 PROCEDURE — ? COUNSELING

## 2023-01-25 PROCEDURE — ? PRESCRIPTION MEDICATION MANAGEMENT

## 2023-01-25 PROCEDURE — ? INTRALESIONAL KENALOG

## 2023-01-25 PROCEDURE — ? PRESCRIPTION

## 2023-01-25 RX ORDER — METHYLPREDNISOLONE 4 MG/1
TABLET ORAL AS DIRECTED
Qty: 1 | Refills: 2 | Status: ERX | COMMUNITY
Start: 2023-01-25

## 2023-01-25 RX ADMIN — METHYLPREDNISOLONE 1: 4 TABLET ORAL at 00:00

## 2023-01-25 ASSESSMENT — LOCATION DETAILED DESCRIPTION DERM
LOCATION DETAILED: PERIUMBILICAL SKIN
LOCATION DETAILED: RIGHT KNEE
LOCATION DETAILED: LEFT MEDIAL UPPER BACK
LOCATION DETAILED: LEFT BUTTOCK
LOCATION DETAILED: RIGHT INFERIOR UPPER BACK

## 2023-01-25 ASSESSMENT — LOCATION SIMPLE DESCRIPTION DERM
LOCATION SIMPLE: ABDOMEN
LOCATION SIMPLE: LEFT UPPER BACK
LOCATION SIMPLE: LEFT BUTTOCK
LOCATION SIMPLE: RIGHT UPPER BACK
LOCATION SIMPLE: RIGHT KNEE

## 2023-01-25 ASSESSMENT — LOCATION ZONE DERM
LOCATION ZONE: TRUNK
LOCATION ZONE: LEG

## 2023-01-25 ASSESSMENT — SEVERITY ASSESSMENT 2020: SEVERITY 2020: MODERATE

## 2023-01-25 NOTE — PROCEDURE: INTRAMUSCULAR KENALOG
Concentration (Mg/Ml): 40.0
Expiration Date (Optional): FEB 2023
Ndc# (Optional): 3700-8732-44
Administered By (Optional): ZULEYMA Dhillon
Add Option For Additional Mediation: Yes
Consent: The risks of atrophy were reviewed with the patient. Risks, benefits, side effects discussed including purpura, increased blood pressure, increased blood sugar, glaucoma, adrenal gland insufficiency, thinning of bones.
Total Volume (Ccs): 1.5
Detail Level: Simple
Kenalog Preparation: kenalog
Concentration (Mg/Ml) Of Additional Medication: 6.0
Lot # (Optional): [id number]

## 2023-01-25 NOTE — PROCEDURE: COUNSELING
Detail Level: Detailed
Detail Level: Zone
Detail Level: Generalized
Moisturizer Recommendations: Recommended daily moisturizer cetaphil, cerave or eucerin
Detail Level: Simple

## 2023-01-25 NOTE — PROCEDURE: PRESCRIPTION MEDICATION MANAGEMENT
Detail Level: Simple
Render In Strict Bullet Format?: No
Continue Regimen: Clobetasol cream/ointment
Plan: Pt will take Medrol prn bad flares in between visits\\nPt declined systemic medications

## 2023-01-25 NOTE — PROCEDURE: INTRALESIONAL KENALOG
How Many Mls Were Removed From The 40 Mg/Ml (5ml) Vial When Preparing The Injectable Solution?: 0
Include Z78.9 (Other Specified Conditions Influencing Health Status) As An Associated Diagnosis?: No
Administered By (Optional): DEANDRA Delvalle
Expiration Date (Optional): SEP 2023
Detail Level: Simple
Validate Note Data When Using Inventory: Yes
Concentration Of Solution Injected (Mg/Ml): 10.0
Show Inventory Tab: Hide
Kenalog Preparation: Kenalog
Total Volume Injected (Ccs- Only Use Numbers And Decimals): .5
Lot # (Optional): [id number]
Ndc# For Kenalog Only: 9160-4395-21
Medical Necessity Clause: This procedure was medically necessary because the lesions that were treated were:
Consent: The risks of atrophy were reviewed with the patient.

## 2023-04-26 ENCOUNTER — APPOINTMENT (RX ONLY)
Dept: URBAN - METROPOLITAN AREA CLINIC 128 | Facility: CLINIC | Age: 73
Setting detail: DERMATOLOGY
End: 2023-04-26

## 2023-04-26 DIAGNOSIS — L29.89 OTHER PRURITUS: ICD-10-CM

## 2023-04-26 DIAGNOSIS — L20.89 OTHER ATOPIC DERMATITIS: ICD-10-CM

## 2023-04-26 PROBLEM — L29.8 OTHER PRURITUS: Status: ACTIVE | Noted: 2023-04-26

## 2023-04-26 PROCEDURE — ? TREATMENT REGIMEN

## 2023-04-26 PROCEDURE — ? COUNSELING

## 2023-04-26 PROCEDURE — ? SEPARATE AND IDENTIFIABLE DOCUMENTATION

## 2023-04-26 PROCEDURE — ? INTRAMUSCULAR KENALOG

## 2023-04-26 PROCEDURE — 99214 OFFICE O/P EST MOD 30 MIN: CPT | Mod: 25

## 2023-04-26 PROCEDURE — ? PRESCRIPTION MEDICATION MANAGEMENT

## 2023-04-26 PROCEDURE — 96372 THER/PROPH/DIAG INJ SC/IM: CPT

## 2023-04-26 ASSESSMENT — LOCATION ZONE DERM
LOCATION ZONE: TRUNK
LOCATION ZONE: LEG

## 2023-04-26 ASSESSMENT — LOCATION SIMPLE DESCRIPTION DERM
LOCATION SIMPLE: ABDOMEN
LOCATION SIMPLE: RIGHT BUTTOCK
LOCATION SIMPLE: RIGHT UPPER BACK
LOCATION SIMPLE: RIGHT KNEE

## 2023-04-26 ASSESSMENT — LOCATION DETAILED DESCRIPTION DERM
LOCATION DETAILED: PERIUMBILICAL SKIN
LOCATION DETAILED: RIGHT BUTTOCK
LOCATION DETAILED: RIGHT INFERIOR UPPER BACK
LOCATION DETAILED: RIGHT KNEE

## 2023-04-26 NOTE — PROCEDURE: INTRAMUSCULAR KENALOG
Concentration (Mg/Ml): 40.0
Expiration Date (Optional): JUN 2024
Ndc# Optional): 4496-8287-46
Administered By (Optional): Jazmine Bender MA
Add Option For Additional Mediation: Yes
Consent: The risks of atrophy were reviewed with the patient. Risks, benefits, side effects discussed including purpura, increased blood pressure, increased blood sugar, glaucoma, adrenal gland insufficiency, thinning of bones.
Total Volume (Ccs): 1.5
Detail Level: Simple
Kenalog Preparation: kenalog
Concentration (Mg/Ml) Of Additional Medication: 6.0
Lot # (Optional): [id number]

## 2023-07-31 ENCOUNTER — APPOINTMENT (RX ONLY)
Dept: URBAN - METROPOLITAN AREA CLINIC 128 | Facility: CLINIC | Age: 73
Setting detail: DERMATOLOGY
End: 2023-07-31

## 2023-07-31 DIAGNOSIS — L85.3 XEROSIS CUTIS: ICD-10-CM

## 2023-07-31 DIAGNOSIS — L20.89 OTHER ATOPIC DERMATITIS: ICD-10-CM

## 2023-07-31 DIAGNOSIS — D69.2 OTHER NONTHROMBOCYTOPENIC PURPURA: ICD-10-CM

## 2023-07-31 DIAGNOSIS — L28.0 LICHEN SIMPLEX CHRONICUS: ICD-10-CM

## 2023-07-31 DIAGNOSIS — L29.89 OTHER PRURITUS: ICD-10-CM

## 2023-07-31 PROBLEM — L29.8 OTHER PRURITUS: Status: ACTIVE | Noted: 2023-07-31

## 2023-07-31 PROCEDURE — ? SEPARATE AND IDENTIFIABLE DOCUMENTATION

## 2023-07-31 PROCEDURE — ? TREATMENT REGIMEN

## 2023-07-31 PROCEDURE — ? COUNSELING

## 2023-07-31 PROCEDURE — 96372 THER/PROPH/DIAG INJ SC/IM: CPT

## 2023-07-31 PROCEDURE — ? INTRAMUSCULAR KENALOG

## 2023-07-31 PROCEDURE — 99214 OFFICE O/P EST MOD 30 MIN: CPT | Mod: 25

## 2023-07-31 PROCEDURE — ? PRESCRIPTION MEDICATION MANAGEMENT

## 2023-07-31 ASSESSMENT — LOCATION ZONE DERM
LOCATION ZONE: TRUNK
LOCATION ZONE: LEG

## 2023-07-31 ASSESSMENT — LOCATION DETAILED DESCRIPTION DERM
LOCATION DETAILED: RIGHT KNEE
LOCATION DETAILED: LEFT MEDIAL UPPER BACK
LOCATION DETAILED: RIGHT BUTTOCK
LOCATION DETAILED: RIGHT INFERIOR UPPER BACK
LOCATION DETAILED: LEFT DISTAL PRETIBIAL REGION
LOCATION DETAILED: RIGHT PROXIMAL PRETIBIAL REGION
LOCATION DETAILED: PERIUMBILICAL SKIN

## 2023-07-31 ASSESSMENT — LOCATION SIMPLE DESCRIPTION DERM
LOCATION SIMPLE: RIGHT KNEE
LOCATION SIMPLE: LEFT PRETIBIAL REGION
LOCATION SIMPLE: RIGHT BUTTOCK
LOCATION SIMPLE: RIGHT PRETIBIAL REGION
LOCATION SIMPLE: RIGHT UPPER BACK
LOCATION SIMPLE: LEFT UPPER BACK
LOCATION SIMPLE: ABDOMEN

## 2023-07-31 NOTE — PROCEDURE: COUNSELING
Detail Level: Generalized
Moisturizer Recommendations: Recommended daily moisturizer cetaphil, cerave or eucerin
Detail Level: Simple
Detail Level: Detailed
Detail Level: Zone

## 2023-07-31 NOTE — PROCEDURE: INTRAMUSCULAR KENALOG
Concentration (Mg/Ml): 40.0
Expiration Date (Optional): JUN 2024
Ndc# Optional): 3705-1573-28
Administered By (Optional): Ines Thomas MA
Add Option For Additional Mediation: Yes
Consent: The risks of atrophy were reviewed with the patient. Risks, benefits, side effects discussed including purpura, increased blood pressure, increased blood sugar, glaucoma, adrenal gland insufficiency, thinning of bones.
Total Volume (Ccs): 1.5
Detail Level: Simple
Kenalog Preparation: kenalog
Concentration (Mg/Ml) Of Additional Medication: 6.0
Lot # (Optional): [id number]

## 2023-11-08 ENCOUNTER — APPOINTMENT (RX ONLY)
Dept: URBAN - METROPOLITAN AREA CLINIC 128 | Facility: CLINIC | Age: 73
Setting detail: DERMATOLOGY
End: 2023-11-08

## 2023-11-08 DIAGNOSIS — L29.89 OTHER PRURITUS: ICD-10-CM

## 2023-11-08 DIAGNOSIS — L20.89 OTHER ATOPIC DERMATITIS: ICD-10-CM

## 2023-11-08 PROBLEM — L29.8 OTHER PRURITUS: Status: ACTIVE | Noted: 2023-11-08

## 2023-11-08 PROCEDURE — 96372 THER/PROPH/DIAG INJ SC/IM: CPT

## 2023-11-08 PROCEDURE — ? PRESCRIPTION MEDICATION MANAGEMENT

## 2023-11-08 PROCEDURE — ? PRESCRIPTION

## 2023-11-08 PROCEDURE — ? SEPARATE AND IDENTIFIABLE DOCUMENTATION

## 2023-11-08 PROCEDURE — ? OTHER

## 2023-11-08 PROCEDURE — ? COUNSELING

## 2023-11-08 PROCEDURE — 99214 OFFICE O/P EST MOD 30 MIN: CPT | Mod: 25

## 2023-11-08 PROCEDURE — ? INTRAMUSCULAR KENALOG

## 2023-11-08 PROCEDURE — ? TREATMENT REGIMEN

## 2023-11-08 RX ORDER — CLOBETASOL PROPIONATE 0.5 MG/G
1 CREAM TOPICAL BID
Qty: 60 | Refills: 3 | Status: ERX

## 2023-11-08 ASSESSMENT — LOCATION SIMPLE DESCRIPTION DERM
LOCATION SIMPLE: RIGHT KNEE
LOCATION SIMPLE: ABDOMEN
LOCATION SIMPLE: LEFT BUTTOCK
LOCATION SIMPLE: RIGHT UPPER BACK

## 2023-11-08 ASSESSMENT — LOCATION DETAILED DESCRIPTION DERM
LOCATION DETAILED: RIGHT KNEE
LOCATION DETAILED: RIGHT INFERIOR UPPER BACK
LOCATION DETAILED: PERIUMBILICAL SKIN
LOCATION DETAILED: LEFT BUTTOCK

## 2023-11-08 ASSESSMENT — LOCATION ZONE DERM
LOCATION ZONE: LEG
LOCATION ZONE: TRUNK

## 2023-11-08 NOTE — PROCEDURE: OTHER
Detail Level: Detailed
Other (Free Text): Patient declines to dupixent
Note Text (......Xxx Chief Complaint.): This diagnosis correlates with the
Render Risk Assessment In Note?: no

## 2023-11-08 NOTE — PROCEDURE: INTRAMUSCULAR KENALOG
Concentration (Mg/Ml): 40.0
Expiration Date (Optional): OCT 2024
Ndc# (Optional): 0746-0644-89
Administered By (Optional): ZULEYMA Dhillon
Add Option For Additional Mediation: Yes
Consent: The risks of atrophy were reviewed with the patient. Risks, benefits, side effects discussed including purpura, increased blood pressure, increased blood sugar, glaucoma, adrenal gland insufficiency, thinning of bones.
Total Volume (Ccs): 1.5
Detail Level: Simple
Kenalog Preparation: kenalog
Concentration (Mg/Ml) Of Additional Medication: 6.0
Lot # (Optional): [id number]

## 2024-05-06 ENCOUNTER — APPOINTMENT (RX ONLY)
Dept: URBAN - METROPOLITAN AREA CLINIC 128 | Facility: CLINIC | Age: 74
Setting detail: DERMATOLOGY
End: 2024-05-06

## 2024-05-06 ENCOUNTER — RX ONLY (OUTPATIENT)
Age: 74
Setting detail: RX ONLY
End: 2024-05-06

## 2024-05-06 DIAGNOSIS — L20.89 OTHER ATOPIC DERMATITIS: ICD-10-CM

## 2024-05-06 DIAGNOSIS — D69.2 OTHER NONTHROMBOCYTOPENIC PURPURA: ICD-10-CM

## 2024-05-06 DIAGNOSIS — L29.89 OTHER PRURITUS: ICD-10-CM

## 2024-05-06 DIAGNOSIS — L28.0 LICHEN SIMPLEX CHRONICUS: ICD-10-CM

## 2024-05-06 PROBLEM — L29.8 OTHER PRURITUS: Status: ACTIVE | Noted: 2024-05-06

## 2024-05-06 PROCEDURE — ? INTRAMUSCULAR KENALOG

## 2024-05-06 PROCEDURE — ? PRESCRIPTION MEDICATION MANAGEMENT

## 2024-05-06 PROCEDURE — 99214 OFFICE O/P EST MOD 30 MIN: CPT | Mod: 25

## 2024-05-06 PROCEDURE — 96372 THER/PROPH/DIAG INJ SC/IM: CPT | Mod: 59

## 2024-05-06 PROCEDURE — ? INTRALESIONAL KENALOG

## 2024-05-06 PROCEDURE — ? COUNSELING

## 2024-05-06 PROCEDURE — 11900 INJECT SKIN LESIONS </W 7: CPT

## 2024-05-06 RX ORDER — TRIAMCINOLONE ACETONIDE 1 MG/G
1 CREAM TOPICAL BID
Qty: 454 | Refills: 2 | Status: ERX

## 2024-05-06 ASSESSMENT — LOCATION SIMPLE DESCRIPTION DERM
LOCATION SIMPLE: LEFT PRETIBIAL REGION
LOCATION SIMPLE: RIGHT KNEE
LOCATION SIMPLE: ABDOMEN
LOCATION SIMPLE: RIGHT PRETIBIAL REGION
LOCATION SIMPLE: RIGHT BUTTOCK
LOCATION SIMPLE: RIGHT UPPER BACK

## 2024-05-06 ASSESSMENT — LOCATION ZONE DERM
LOCATION ZONE: LEG
LOCATION ZONE: TRUNK

## 2024-05-06 ASSESSMENT — LOCATION DETAILED DESCRIPTION DERM
LOCATION DETAILED: RIGHT INFERIOR UPPER BACK
LOCATION DETAILED: LEFT DISTAL PRETIBIAL REGION
LOCATION DETAILED: RIGHT BUTTOCK
LOCATION DETAILED: RIGHT PROXIMAL PRETIBIAL REGION
LOCATION DETAILED: PERIUMBILICAL SKIN
LOCATION DETAILED: RIGHT KNEE

## 2024-05-06 ASSESSMENT — SEVERITY ASSESSMENT 2020: SEVERITY 2020: MODERATE

## 2024-05-06 ASSESSMENT — ITCH NUMERIC RATING SCALE: HOW SEVERE IS YOUR ITCHING?: 3

## 2024-05-06 NOTE — PROCEDURE: PRESCRIPTION MEDICATION MANAGEMENT
Detail Level: Simple
Render In Strict Bullet Format?: No
Continue Regimen: Clobetasol cream/ointment
Plan: Pt will take Medrol prn bad flares in between visits\\nPt declines biologics.

## 2024-05-06 NOTE — PROCEDURE: INTRAMUSCULAR KENALOG
Consent: The risks of atrophy were reviewed with the patient. Risks, benefits, side effects discussed including purpura, increased blood pressure, increased blood sugar, glaucoma, adrenal gland insufficiency, thinning of bones.
Lot # (Optional): [id number]
Kenalog Preparation: kenalog
Concentration (Mg/Ml): 40.0
Administered By (Optional): ZULEYMA [place]
Total Volume (Ccs): 1.5
Ndc# (Optional): 8757-8636-23
Add Option For Additional Mediation: Yes
Concentration (Mg/Ml) Of Additional Medication: 6.0
Detail Level: Simple
Expiration Date (Optional): JAN 2026

## 2024-05-06 NOTE — PROCEDURE: COUNSELING
Detail Level: Detailed
Dermend Recommendations: Fragile or bruising skin formula
Detail Level: Zone
Detail Level: Simple

## 2024-05-06 NOTE — PROCEDURE: INTRALESIONAL KENALOG
Kenalog Type Of Vial: Single Dose
Administered By (Optional): DEANDRA Rodriguez
How Many Mls Were Removed From The 80 Mg/Ml (5ml) Vial When Preparing The Injectable Solution?: 0
Medical Necessity Clause: This procedure was medically necessary because the lesions that were treated were:
Ndc# For Kenalog Only: 5358-6981-41
Detail Level: Detailed
Include Z78.9 (Other Specified Conditions Influencing Health Status) As An Associated Diagnosis?: No
Total Volume (Ccs): 1
Concentration Of Kenalog Solution Injected (Mg/Ml): 10.0
Consent: The risks of atrophy were reviewed with the patient.
Kenalog Preparation: Kenalog
Show Inventory Tab: Hide
Expiration Date For Kenalog (Optional): DEC 2025
Lot # For Kenalog (Optional): [id number]
Validate Note Data When Using Inventory: Yes

## 2024-06-19 ENCOUNTER — APPOINTMENT (RX ONLY)
Dept: URBAN - METROPOLITAN AREA CLINIC 128 | Facility: CLINIC | Age: 74
Setting detail: DERMATOLOGY
End: 2024-06-19

## 2024-06-19 DIAGNOSIS — D69.2 OTHER NONTHROMBOCYTOPENIC PURPURA: ICD-10-CM

## 2024-06-19 DIAGNOSIS — L28.0 LICHEN SIMPLEX CHRONICUS: ICD-10-CM | Status: IMPROVED

## 2024-06-19 DIAGNOSIS — L20.89 OTHER ATOPIC DERMATITIS: ICD-10-CM | Status: IMPROVED

## 2024-06-19 PROCEDURE — ? COUNSELING

## 2024-06-19 PROCEDURE — 99214 OFFICE O/P EST MOD 30 MIN: CPT | Mod: 25

## 2024-06-19 PROCEDURE — 96372 THER/PROPH/DIAG INJ SC/IM: CPT

## 2024-06-19 PROCEDURE — ? INTRAMUSCULAR KENALOG

## 2024-06-19 PROCEDURE — ? PRESCRIPTION MEDICATION MANAGEMENT

## 2024-06-19 PROCEDURE — ? PRESCRIPTION

## 2024-06-19 RX ORDER — METHYLPREDNISOLONE 4 MG/1
TABLET ORAL AS DIRECTED
Qty: 1 | Refills: 3 | Status: ERX

## 2024-06-19 RX ORDER — BETAMETHASONE DIPROPIONATE 0.5 MG/G
CREAM TOPICAL BID
Qty: 45 | Refills: 2 | Status: ERX | COMMUNITY
Start: 2024-06-19

## 2024-06-19 RX ADMIN — BETAMETHASONE DIPROPIONATE: 0.5 CREAM TOPICAL at 00:00

## 2024-06-19 ASSESSMENT — LOCATION SIMPLE DESCRIPTION DERM
LOCATION SIMPLE: RIGHT UPPER BACK
LOCATION SIMPLE: ABDOMEN
LOCATION SIMPLE: RIGHT KNEE
LOCATION SIMPLE: RIGHT PRETIBIAL REGION
LOCATION SIMPLE: LEFT BUTTOCK
LOCATION SIMPLE: LEFT PRETIBIAL REGION

## 2024-06-19 ASSESSMENT — LOCATION DETAILED DESCRIPTION DERM
LOCATION DETAILED: LEFT DISTAL PRETIBIAL REGION
LOCATION DETAILED: RIGHT PROXIMAL PRETIBIAL REGION
LOCATION DETAILED: LEFT BUTTOCK
LOCATION DETAILED: PERIUMBILICAL SKIN
LOCATION DETAILED: RIGHT INFERIOR UPPER BACK
LOCATION DETAILED: RIGHT KNEE

## 2024-06-19 ASSESSMENT — SEVERITY ASSESSMENT 2020: SEVERITY 2020: MODERATE

## 2024-06-19 ASSESSMENT — LOCATION ZONE DERM
LOCATION ZONE: LEG
LOCATION ZONE: TRUNK

## 2024-06-19 ASSESSMENT — SEVERITY ASSESSMENT: SEVERITY: MILD

## 2024-06-19 ASSESSMENT — ITCH NUMERIC RATING SCALE: HOW SEVERE IS YOUR ITCHING?: 2

## 2024-06-19 NOTE — PROCEDURE: INTRAMUSCULAR KENALOG
Concentration (Mg/Ml): 40.0
Kenalog Preparation: kenalog
Detail Level: Simple
Administered By (Optional): ZULEYMA Dhillon
Consent: The risks of atrophy were reviewed with the patient. Risks, benefits, side effects discussed including purpura, increased blood pressure, increased blood sugar, glaucoma, adrenal gland insufficiency, thinning of bones.
Lot # (Optional): [id number]
Expiration Date (Optional): JAN 2026
Concentration (Mg/Ml) Of Additional Medication: 6.0
Add Option For Additional Mediation: Yes
Total Volume (Ccs): 1.5
Ndc# (Optional): 6389-4884-03

## 2024-07-31 ENCOUNTER — APPOINTMENT (RX ONLY)
Dept: URBAN - METROPOLITAN AREA CLINIC 128 | Facility: CLINIC | Age: 74
Setting detail: DERMATOLOGY
End: 2024-07-31

## 2024-07-31 DIAGNOSIS — B00.1 HERPESVIRAL VESICULAR DERMATITIS: ICD-10-CM

## 2024-07-31 DIAGNOSIS — D69.2 OTHER NONTHROMBOCYTOPENIC PURPURA: ICD-10-CM

## 2024-07-31 DIAGNOSIS — L20.89 OTHER ATOPIC DERMATITIS: ICD-10-CM | Status: IMPROVED

## 2024-07-31 DIAGNOSIS — L28.0 LICHEN SIMPLEX CHRONICUS: ICD-10-CM

## 2024-07-31 PROCEDURE — ? PRESCRIPTION MEDICATION MANAGEMENT

## 2024-07-31 PROCEDURE — ? REFUSAL OF TREATMENT

## 2024-07-31 PROCEDURE — ? INTRAMUSCULAR KENALOG

## 2024-07-31 PROCEDURE — 96372 THER/PROPH/DIAG INJ SC/IM: CPT

## 2024-07-31 PROCEDURE — 99214 OFFICE O/P EST MOD 30 MIN: CPT | Mod: 25

## 2024-07-31 PROCEDURE — ? COUNSELING

## 2024-07-31 ASSESSMENT — LOCATION DETAILED DESCRIPTION DERM
LOCATION DETAILED: PERIUMBILICAL SKIN
LOCATION DETAILED: RIGHT KNEE
LOCATION DETAILED: RIGHT INFERIOR UPPER BACK
LOCATION DETAILED: GLUTEAL CLEFT
LOCATION DETAILED: LEFT DISTAL PRETIBIAL REGION
LOCATION DETAILED: RIGHT PROXIMAL PRETIBIAL REGION
LOCATION DETAILED: RIGHT BUTTOCK

## 2024-07-31 ASSESSMENT — LOCATION SIMPLE DESCRIPTION DERM
LOCATION SIMPLE: RIGHT PRETIBIAL REGION
LOCATION SIMPLE: LEFT PRETIBIAL REGION
LOCATION SIMPLE: RIGHT KNEE
LOCATION SIMPLE: RIGHT UPPER BACK
LOCATION SIMPLE: RIGHT BUTTOCK
LOCATION SIMPLE: ABDOMEN
LOCATION SIMPLE: GLUTEAL CLEFT

## 2024-07-31 ASSESSMENT — LOCATION ZONE DERM
LOCATION ZONE: LEG
LOCATION ZONE: TRUNK

## 2024-07-31 ASSESSMENT — SEVERITY ASSESSMENT 2020: SEVERITY 2020: MODERATE

## 2024-07-31 ASSESSMENT — ITCH NUMERIC RATING SCALE: HOW SEVERE IS YOUR ITCHING?: 3

## 2024-07-31 NOTE — PROCEDURE: INTRAMUSCULAR KENALOG
Concentration (Mg/Ml): 40.0
Kenalog Preparation: kenalog
Detail Level: Simple
Administered By (Optional): ZULEYMA Dhillon
Consent: The risks of atrophy were reviewed with the patient. Risks, benefits, side effects discussed including purpura, increased blood pressure, increased blood sugar, glaucoma, adrenal gland insufficiency, thinning of bones.
Lot # (Optional): [id number]
Expiration Date (Optional): JAN 2026
Concentration (Mg/Ml) Of Additional Medication: 6.0
Add Option For Additional Mediation: Yes
Total Volume (Ccs): 1.5
Ndc# (Optional): 3208-0878-54

## 2024-07-31 NOTE — PROCEDURE: COUNSELING
Detail Level: Detailed
Detail Level: Simple
Dermend Recommendations: Fragile or bruising skin formula
Detail Level: Zone

## 2024-07-31 NOTE — PROCEDURE: PRESCRIPTION MEDICATION MANAGEMENT
Detail Level: Simple
Render In Strict Bullet Format?: No
Continue Regimen: Clobetasol cream/ointment\\n\\nbetamethasone dipropionate 0.05 % topical cream BID
Plan: Pt will take Medrol prn bad flares in between visits\\nPt declines biologics.

## 2025-03-20 ENCOUNTER — APPOINTMENT (OUTPATIENT)
Dept: URBAN - METROPOLITAN AREA CLINIC 116 | Facility: CLINIC | Age: 75
Setting detail: DERMATOLOGY
End: 2025-03-20

## 2025-03-20 DIAGNOSIS — L20.89 OTHER ATOPIC DERMATITIS: ICD-10-CM | Status: INADEQUATELY CONTROLLED

## 2025-03-20 PROCEDURE — ? COUNSELING

## 2025-03-20 PROCEDURE — 96372 THER/PROPH/DIAG INJ SC/IM: CPT

## 2025-03-20 PROCEDURE — ? PRESCRIPTION

## 2025-03-20 PROCEDURE — ? COUNSELING: TOPICAL STEROIDS

## 2025-03-20 PROCEDURE — ? SEPARATE AND IDENTIFIABLE DOCUMENTATION

## 2025-03-20 PROCEDURE — ? PRESCRIPTION MEDICATION MANAGEMENT

## 2025-03-20 PROCEDURE — 99214 OFFICE O/P EST MOD 30 MIN: CPT | Mod: 25

## 2025-03-20 PROCEDURE — ? INTRAMUSCULAR KENALOG

## 2025-03-20 PROCEDURE — ? DUPIXENT INITIATION

## 2025-03-20 RX ORDER — DUPILUMAB 300 MG/2ML
INJECTION, SOLUTION SUBCUTANEOUS
Qty: 2 | Refills: 11 | Status: ERX

## 2025-03-20 RX ORDER — BETAMETHASONE DIPROPIONATE 0.5 MG/ML
LOTION TOPICAL
Qty: 60 | Refills: 2 | Status: ERX | COMMUNITY
Start: 2025-03-20

## 2025-03-20 RX ORDER — DUPILUMAB 300 MG/2ML
INJECTION, SOLUTION SUBCUTANEOUS
Qty: 2 | Refills: 1 | Status: ERX | COMMUNITY
Start: 2025-03-20

## 2025-03-20 RX ADMIN — DUPILUMAB: 300 INJECTION, SOLUTION SUBCUTANEOUS at 00:00

## 2025-03-20 RX ADMIN — BETAMETHASONE DIPROPIONATE: 0.5 LOTION TOPICAL at 00:00

## 2025-03-20 ASSESSMENT — LOCATION DETAILED DESCRIPTION DERM
LOCATION DETAILED: RIGHT BUTTOCK
LOCATION DETAILED: RIGHT INFERIOR UPPER BACK
LOCATION DETAILED: PERIUMBILICAL SKIN
LOCATION DETAILED: RIGHT KNEE

## 2025-03-20 ASSESSMENT — ITCH NUMERIC RATING SCALE: HOW SEVERE IS YOUR ITCHING?: 10

## 2025-03-20 ASSESSMENT — LOCATION ZONE DERM
LOCATION ZONE: TRUNK
LOCATION ZONE: LEG

## 2025-03-20 ASSESSMENT — LOCATION SIMPLE DESCRIPTION DERM
LOCATION SIMPLE: RIGHT BUTTOCK
LOCATION SIMPLE: RIGHT KNEE
LOCATION SIMPLE: ABDOMEN
LOCATION SIMPLE: RIGHT UPPER BACK

## 2025-03-20 ASSESSMENT — BSA ECZEMA: % BODY COVERED IN ECZEMA: 10

## 2025-03-20 ASSESSMENT — SEVERITY ASSESSMENT 2020: SEVERITY 2020: MODERATE

## 2025-03-20 NOTE — PROCEDURE: DUPIXENT INITIATION
Is Phototherapy Contraindicated?: No
Diagnosis (Required): Atopic Dermatitis/Eczematous Dermatitis
Pregnancy And Lactation Warning Text: There have not been adverse fetal risks in women taking Dupixent while pregnant. It is unknown if this medication is excreted in breast milk.
Dupixent Dosing: 600 mg SC day 0 then 300 mg SC every other week
Dupixent Monitoring Guidelines: There is no laboratory monitoring requirement with Dupixent.
Detail Level: Simple

## 2025-03-20 NOTE — PROCEDURE: PRESCRIPTION MEDICATION MANAGEMENT
Detail Level: Simple
Render In Strict Bullet Format?: No
Continue Regimen: Clobetasol cream/ointment\\n\\nbetamethasone dipropionate 0.05 % topical cream BID
Plan: Pt will take Medrol prn bad flares in between visits

## 2025-03-20 NOTE — PROCEDURE: INTRAMUSCULAR KENALOG
Concentration (Mg/Ml): 40.0
Kenalog Preparation: kenalog
Detail Level: Simple
Administered By (Optional): Andreina CRAWFORD MA
Consent: The risks of atrophy were reviewed with the patient. Risks, benefits, side effects discussed including purpura, increased blood pressure, increased blood sugar, glaucoma, adrenal gland insufficiency, thinning of bones.
Lot # (Optional): [id number]
Expiration Date (Optional): MAY 2026
Concentration (Mg/Ml) Of Additional Medication: 6.0
Add Option For Additional Mediation: Yes
Other Medications: Celestone
Total Volume (Ccs): 1.5
Ndc# (Optional): 2827-3137-47

## 2025-03-26 RX ORDER — DUPILUMAB 300 MG/2ML
INJECTION, SOLUTION SUBCUTANEOUS
Qty: 2 | Refills: 1 | Status: ERX

## 2025-03-26 RX ORDER — DUPILUMAB 300 MG/2ML
INJECTION, SOLUTION SUBCUTANEOUS
Qty: 2 | Refills: 11 | Status: ERX

## 2025-05-29 ENCOUNTER — APPOINTMENT (OUTPATIENT)
Dept: URBAN - METROPOLITAN AREA CLINIC 116 | Facility: CLINIC | Age: 75
Setting detail: DERMATOLOGY
End: 2025-05-29

## 2025-05-29 DIAGNOSIS — L20.89 OTHER ATOPIC DERMATITIS: ICD-10-CM | Status: IMPROVED

## 2025-05-29 PROCEDURE — OTHER: HCPCS

## 2025-05-29 PROCEDURE — ? COUNSELING

## 2025-05-29 PROCEDURE — ? SEPARATE AND IDENTIFIABLE DOCUMENTATION

## 2025-05-29 PROCEDURE — ? INTRAMUSCULAR KENALOG

## 2025-05-29 PROCEDURE — ? PRESCRIPTION MEDICATION MANAGEMENT

## 2025-05-29 PROCEDURE — ?

## 2025-05-29 PROCEDURE — ?: Mod: 25

## 2025-05-29 ASSESSMENT — BSA ECZEMA: % BODY COVERED IN ECZEMA: 5

## 2025-05-29 ASSESSMENT — LOCATION DETAILED DESCRIPTION DERM
LOCATION DETAILED: PERIUMBILICAL SKIN
LOCATION DETAILED: RIGHT INFERIOR UPPER BACK
LOCATION DETAILED: LEFT BUTTOCK
LOCATION DETAILED: RIGHT KNEE

## 2025-05-29 ASSESSMENT — LOCATION SIMPLE DESCRIPTION DERM
LOCATION SIMPLE: RIGHT UPPER BACK
LOCATION SIMPLE: LEFT BUTTOCK
LOCATION SIMPLE: ABDOMEN
LOCATION SIMPLE: RIGHT KNEE

## 2025-05-29 ASSESSMENT — ITCH NUMERIC RATING SCALE: HOW SEVERE IS YOUR ITCHING?: 3

## 2025-05-29 ASSESSMENT — LOCATION ZONE DERM
LOCATION ZONE: LEG
LOCATION ZONE: TRUNK

## 2025-05-29 ASSESSMENT — SEVERITY ASSESSMENT 2020: SEVERITY 2020: MODERATE

## 2025-05-29 NOTE — PROCEDURE: INTRAMUSCULAR KENALOG
Concentration (Mg/Ml): 40.0
Kenalog Preparation: kenalog
Detail Level: Simple
Administered By (Optional): ZULEYMA Dhillon
Consent: The risks of atrophy were reviewed with the patient. Risks, benefits, side effects discussed including purpura, increased blood pressure, increased blood sugar, glaucoma, adrenal gland insufficiency, thinning of bones.
Lot # (Optional): [id number]
Expiration Date (Optional): MAY 2026
Concentration (Mg/Ml) Of Additional Medication: 6.0
Add Option For Additional Mediation: Yes
Total Volume (Ccs): 1.5
Ndc# (Optional): 9510-3600-18

## 2025-05-29 NOTE — PROCEDURE: PRESCRIPTION MEDICATION MANAGEMENT
Detail Level: Simple
Render In Strict Bullet Format?: No
Continue Regimen: Clobetasol cream/ointment\\n\\nbetamethasone dipropionate 0.05 % lotion \\n\\nbetamethasone dipropionate 0.05 % topical cream
Plan: Pt will take Medrol prn bad flares in between visits

## 2025-06-25 RX ORDER — HYDROXYZINE HYDROCHLORIDE 10 MG/1
1 TABLET, FILM COATED ORAL QHS
Qty: 60 | Refills: 1 | Status: ERX

## 2025-08-07 ENCOUNTER — APPOINTMENT (OUTPATIENT)
Dept: URBAN - METROPOLITAN AREA CLINIC 116 | Facility: CLINIC | Age: 75
Setting detail: DERMATOLOGY
End: 2025-08-07

## 2025-08-07 DIAGNOSIS — L28.0 LICHEN SIMPLEX CHRONICUS: ICD-10-CM

## 2025-08-07 DIAGNOSIS — L20.89 OTHER ATOPIC DERMATITIS: ICD-10-CM

## 2025-08-07 DIAGNOSIS — L57.0 ACTINIC KERATOSIS: ICD-10-CM

## 2025-08-07 PROCEDURE — ?: Mod: 59

## 2025-08-07 PROCEDURE — ? PRESCRIPTION MEDICATION MANAGEMENT

## 2025-08-07 PROCEDURE — ? COUNSELING

## 2025-08-07 PROCEDURE — ?

## 2025-08-07 PROCEDURE — ? SEPARATE AND IDENTIFIABLE DOCUMENTATION

## 2025-08-07 PROCEDURE — ? INTRAMUSCULAR KENALOG

## 2025-08-07 PROCEDURE — ? INTRALESIONAL KENALOG

## 2025-08-07 PROCEDURE — ? LIQUID NITROGEN

## 2025-08-07 PROCEDURE — OTHER: HCPCS

## 2025-08-07 PROCEDURE — ?: Mod: 25

## 2025-08-07 ASSESSMENT — LOCATION DETAILED DESCRIPTION DERM
LOCATION DETAILED: RIGHT KNEE
LOCATION DETAILED: RIGHT INFERIOR UPPER BACK
LOCATION DETAILED: LEFT SUPERIOR LATERAL BUCCAL CHEEK
LOCATION DETAILED: RIGHT MEDIAL KNEE
LOCATION DETAILED: RIGHT BUTTOCK
LOCATION DETAILED: PERIUMBILICAL SKIN

## 2025-08-07 ASSESSMENT — LOCATION ZONE DERM
LOCATION ZONE: TRUNK
LOCATION ZONE: LEG
LOCATION ZONE: FACE

## 2025-08-07 ASSESSMENT — LOCATION SIMPLE DESCRIPTION DERM
LOCATION SIMPLE: RIGHT UPPER BACK
LOCATION SIMPLE: ABDOMEN
LOCATION SIMPLE: RIGHT KNEE
LOCATION SIMPLE: RIGHT BUTTOCK
LOCATION SIMPLE: LEFT CHEEK